# Patient Record
Sex: FEMALE | Employment: FULL TIME | URBAN - METROPOLITAN AREA
[De-identification: names, ages, dates, MRNs, and addresses within clinical notes are randomized per-mention and may not be internally consistent; named-entity substitution may affect disease eponyms.]

---

## 2024-05-31 NOTE — PROGRESS NOTES
minimal axial narrowing of the right hip joint space left hip joint space pubic symphysis sacroiliac joints are maintained.  Degenerative changes lower lumbar  3/31/2023 MRI lumbar spine mild L5-S1 degenerative changes.  Normal remaining lumbar spine.  L5-S1 decreased disc signal minimal bulge eccentric to the left causing mild left subarticular recess encroachment with with indentation and without compression of the traversing S1 nerve root.      6/4/2024 physical therapy progress note right-sided pain only.  May start in the shin of the hip and the thigh and not be in the lumbar.  More down the front of the shin into the posterior thigh the back of the knee.  Stinging pain.  Increased pain with standing moving more than sitting.  Increased pain with walking and in stores.  Right side worse than the left.  Pain on the right radiates laterally to the bottom of the right foot.  On the left side into the groin and inner thigh.  May go to the knee.  Heavy feeling both lower extremities    HISTORY OF PRESENT ILLNESS:  Very pleasant intelligent cooperative healthy-appearing female comes in today with her  for a chronic pain she has had since 2 years ago when she had a twisting injury to her lumbar spine.  Because this injury she has not been able to enjoy her activities as she could before the injury.  She is limited to about 30 minutes of activity with his gardening shopping walking.  She is still able to get all of her work done.  She does not let her  know that she is having problems because she wants to take care of him.    Examination is objectively normal.    Difficult to assess her pain generator.  May have an element of right sacroiliitis as pressure in this area and immobility seems to help.  Also has groin pain buttock pain indicating hip or supporting structures as pain generators.  Definite radicular component to her pain into the right lateral thigh shin and bottom of the foot.    After

## 2024-06-06 ENCOUNTER — OFFICE VISIT (OUTPATIENT)
Dept: NEUROSURGERY | Age: 54
End: 2024-06-06
Payer: COMMERCIAL

## 2024-06-06 VITALS
SYSTOLIC BLOOD PRESSURE: 116 MMHG | WEIGHT: 105.82 LBS | HEIGHT: 64 IN | DIASTOLIC BLOOD PRESSURE: 72 MMHG | TEMPERATURE: 97 F | BODY MASS INDEX: 18.07 KG/M2

## 2024-06-06 DIAGNOSIS — M25.551 RIGHT HIP PAIN: ICD-10-CM

## 2024-06-06 DIAGNOSIS — M46.1 SACROILIITIS (HCC): ICD-10-CM

## 2024-06-06 DIAGNOSIS — M47.27 LUMBOSACRAL SPONDYLOSIS WITH RADICULOPATHY: Primary | ICD-10-CM

## 2024-06-06 PROCEDURE — 99203 OFFICE O/P NEW LOW 30 MIN: CPT | Performed by: NEUROLOGICAL SURGERY

## 2024-06-06 RX ORDER — IBUPROFEN 600 MG/1
600 TABLET ORAL EVERY 6 HOURS PRN
COMMUNITY
Start: 2024-03-06

## 2024-06-06 RX ORDER — BUPROPION HYDROCHLORIDE 150 MG/1
150 TABLET ORAL DAILY
COMMUNITY
Start: 2024-05-11

## 2024-06-06 ASSESSMENT — ENCOUNTER SYMPTOMS
EYE PAIN: 0
CONSTIPATION: 0
BACK PAIN: 0
SHORTNESS OF BREATH: 0
NAUSEA: 0

## 2024-06-11 ENCOUNTER — INITIAL CONSULT (OUTPATIENT)
Dept: PAIN MANAGEMENT | Age: 54
End: 2024-06-11
Payer: COMMERCIAL

## 2024-06-11 VITALS
WEIGHT: 105 LBS | SYSTOLIC BLOOD PRESSURE: 114 MMHG | DIASTOLIC BLOOD PRESSURE: 70 MMHG | BODY MASS INDEX: 19.83 KG/M2 | HEIGHT: 61 IN | TEMPERATURE: 97.8 F

## 2024-06-11 DIAGNOSIS — M51.36 DDD (DEGENERATIVE DISC DISEASE), LUMBAR: ICD-10-CM

## 2024-06-11 DIAGNOSIS — M16.11 OSTEOARTHRITIS OF RIGHT HIP, UNSPECIFIED OSTEOARTHRITIS TYPE: Primary | ICD-10-CM

## 2024-06-11 DIAGNOSIS — M46.1 SACROILIITIS, NOT ELSEWHERE CLASSIFIED (HCC): ICD-10-CM

## 2024-06-11 PROCEDURE — 99203 OFFICE O/P NEW LOW 30 MIN: CPT | Performed by: PAIN MEDICINE

## 2024-06-11 ASSESSMENT — ENCOUNTER SYMPTOMS
RESPIRATORY NEGATIVE: 1
EYES NEGATIVE: 1
ALLERGIC/IMMUNOLOGIC NEGATIVE: 1
GASTROINTESTINAL NEGATIVE: 1

## 2024-06-11 NOTE — PROGRESS NOTES
History of Present Illness     Patient Identification  Ramon Jacob is a 54 y.o. female.    Patient information was obtained from patient.      Chief Complaint   Chief Complaint   Patient presents with    Hip Pain     Right hip pain       Patient presents with complaint of Right back pain. This is a result of twisting her back. Onset of pain was 2 years ago and has been unchanged since. The pain is located in right lower back, described as stabbing and rated as moderate and 5 / 10, with radiation Rt groin abd her Calf. Symptoms include no other symptoms. The patient also complains of fever, dysuria, weight loss, history of cancer, history of osteoporosis, history of steroid use, obesity. The patient denies weakness, numbness, incontinence. The patient denies other injuries. Care prior to arrival consisted of Facet blocks with no relief.  MRI: 2/1/24: MRI lumbar spine sacralization L5 L4-5 circumferential disc bulge mild left lateral recess stenosis moderate bilateral foraminal stenosis right greater than left L3-4 mild left foraminal disc bulge with annular fissure. Mild left foraminal stenosis     History reviewed. No pertinent past medical history.  History reviewed. No pertinent family history.  Current Outpatient Medications   Medication Sig Dispense Refill    ibuprofen (ADVIL;MOTRIN) 600 MG tablet Take 1 tablet by mouth every 6 hours as needed for Pain      buPROPion (WELLBUTRIN XL) 150 MG extended release tablet Take 1 tablet by mouth daily       No current facility-administered medications for this visit.     No Known Allergies    Review of Systems  Review of Systems   Constitutional: Negative.    HENT: Negative.     Eyes: Negative.    Respiratory: Negative.     Cardiovascular: Negative.    Gastrointestinal: Negative.    Endocrine: Negative.    Genitourinary: Negative.    Musculoskeletal: Negative.    Skin: Negative.    Allergic/Immunologic: Negative.    Neurological: Negative.    Hematological:

## 2024-06-13 ENCOUNTER — TELEPHONE (OUTPATIENT)
Dept: PAIN MANAGEMENT | Age: 54
End: 2024-06-13

## 2024-06-13 NOTE — TELEPHONE ENCOUNTER
RIGHT HIP INJECTION    NO AUTH REQUIRED    OK to schedule procedure approved as above.   Please note sides/levels approved and date range.   (If applicable, sides/levels approved may differ from those ordered)    TO BE SCHEDULED WITH

## 2024-07-11 ENCOUNTER — PROCEDURE VISIT (OUTPATIENT)
Age: 54
End: 2024-07-11
Payer: COMMERCIAL

## 2024-07-11 VITALS
HEIGHT: 61 IN | SYSTOLIC BLOOD PRESSURE: 106 MMHG | HEART RATE: 76 BPM | OXYGEN SATURATION: 96 % | WEIGHT: 100 LBS | DIASTOLIC BLOOD PRESSURE: 70 MMHG | BODY MASS INDEX: 18.88 KG/M2 | TEMPERATURE: 97.7 F

## 2024-07-11 DIAGNOSIS — M16.11 OSTEOARTHRITIS OF RIGHT HIP, UNSPECIFIED OSTEOARTHRITIS TYPE: Primary | ICD-10-CM

## 2024-07-11 PROCEDURE — 20610 DRAIN/INJ JOINT/BURSA W/O US: CPT | Performed by: PAIN MEDICINE

## 2024-07-11 PROCEDURE — 77002 NEEDLE LOCALIZATION BY XRAY: CPT | Performed by: PAIN MEDICINE

## 2024-07-11 RX ORDER — LIDOCAINE HYDROCHLORIDE 10 MG/ML
3 INJECTION, SOLUTION EPIDURAL; INFILTRATION; INTRACAUDAL; PERINEURAL ONCE
Status: COMPLETED | OUTPATIENT
Start: 2024-07-11 | End: 2024-07-11

## 2024-07-11 RX ORDER — TRIAMCINOLONE ACETONIDE 40 MG/ML
40 INJECTION, SUSPENSION INTRA-ARTICULAR; INTRAMUSCULAR ONCE
Status: COMPLETED | OUTPATIENT
Start: 2024-07-11 | End: 2024-07-11

## 2024-07-11 RX ORDER — BUPIVACAINE HYDROCHLORIDE 5 MG/ML
30 INJECTION, SOLUTION EPIDURAL; INTRACAUDAL ONCE
Status: COMPLETED | OUTPATIENT
Start: 2024-07-11 | End: 2024-07-11

## 2024-07-11 RX ADMIN — LIDOCAINE HYDROCHLORIDE 3 ML: 10 INJECTION, SOLUTION EPIDURAL; INFILTRATION; INTRACAUDAL; PERINEURAL at 12:54

## 2024-07-11 RX ADMIN — BUPIVACAINE HYDROCHLORIDE 150 MG: 5 INJECTION, SOLUTION EPIDURAL; INTRACAUDAL at 12:54

## 2024-07-11 RX ADMIN — TRIAMCINOLONE ACETONIDE 40 MG: 400 INJECTION, SUSPENSION INTRA-ARTICULAR; INTRAMUSCULAR at 12:54

## 2024-07-11 ASSESSMENT — ENCOUNTER SYMPTOMS
EYES NEGATIVE: 1
ALLERGIC/IMMUNOLOGIC NEGATIVE: 1
GASTROINTESTINAL NEGATIVE: 1
RESPIRATORY NEGATIVE: 1

## 2024-07-11 NOTE — PROGRESS NOTES
complications with patient and addressed  Concerns.  Informed Consent Obtained. Supine on OR Table, skin site for initial needle placement marked using Fluroscopy, Anesthetised with 2% lidocaine 2cc  . 23g needle advanced towards Right Hip Joint injection 0f 0.5cc Omnipaque produced good arthrogram,  Injected 2cc 0.5% Bupivacaine and 40mg Kenalog  with good 100% relief, Skin washed with wet Towel and Pt discharged in stable condition.

## 2024-07-19 ENCOUNTER — PROCEDURE VISIT (OUTPATIENT)
Dept: PAIN MANAGEMENT | Age: 54
End: 2024-07-19

## 2024-07-19 DIAGNOSIS — M79.604 RIGHT LEG PAIN: Primary | ICD-10-CM

## 2024-07-19 NOTE — PROGRESS NOTES
Electromyography (EMG)/Nerve conduction studies (NCS) Report: Lower Extremity    Name: Ramon Jacob   YOB: 1970  Date of Service: 7/19/2024   Provider: Moo Brooks MD        INDICATIONS:  Ramon Jacob is a 54 y.o. female who presents for electrodiagnostic evaluation for lumbar radiculopathy by request of Dr. Twila Salcido. Her main symptom for evaluation is right leg pain. Both limbs are necessary to examine in order to evaluate for any evidence of systemic disease as well as establish normal baseline values from which to compare any abnormal unilateral findings. The study is explained and verbal consent to proceed is obtained.     NERVE CONDUCTION STUDIES:    Sensory nerve conduction studies: Bilateral sural and superficial peroneal sensory nerve conduction studies demonstrate normal peak latencies and amplitudes. Waveforms are robust throughout. Bilateral lower limb temperatures are normal.     Motor nerve conduction studies: Bilateral peroneal motor nerve conduction studies with pickup over the extensor digitorum brevis demonstrate normal distal latencies and amplitudes. Bilateral tibial motor nerve conduction studies with pickup over the abductor hallucis demonstrate normal distal latencies and amplitudes.    H reflex: Bilateral H reflexes are symmetric and not prolonged.    ELECTROMYOGRAPHY: A disposable monopolar needle is used to evaluate the right vastus medialis, vastus lateralis, rectus femoris, tibialis anterior, extensor hallucis longus, flexor digitorum longus, peroneus longus, medial gastrocnemius, and lateral gastrocnemius. All of the muscles sampled are free of any increased insertional activity or any abnormal spontaneous activity. Motor unit recruitment is unremarkable.     A disposable monopolar needle is used to evaluate the left vastus medialis, tibialis anterior, extensor hallucis longus, flexor digitorum longus, peroneus longus, medial gastrocnemius, and lateral

## 2024-07-24 ENCOUNTER — OFFICE VISIT (OUTPATIENT)
Dept: PAIN MANAGEMENT | Age: 54
End: 2024-07-24
Payer: COMMERCIAL

## 2024-07-24 VITALS
BODY MASS INDEX: 18.88 KG/M2 | SYSTOLIC BLOOD PRESSURE: 120 MMHG | WEIGHT: 100 LBS | DIASTOLIC BLOOD PRESSURE: 74 MMHG | HEIGHT: 61 IN | TEMPERATURE: 97.2 F

## 2024-07-24 DIAGNOSIS — M47.27 LUMBOSACRAL SPONDYLOSIS WITH RADICULOPATHY: Primary | ICD-10-CM

## 2024-07-24 PROCEDURE — 99213 OFFICE O/P EST LOW 20 MIN: CPT | Performed by: PAIN MEDICINE

## 2024-07-24 ASSESSMENT — ENCOUNTER SYMPTOMS
ALLERGIC/IMMUNOLOGIC NEGATIVE: 1
RESPIRATORY NEGATIVE: 1
EYES NEGATIVE: 1
GASTROINTESTINAL NEGATIVE: 1

## 2024-07-24 NOTE — PROGRESS NOTES
History of Present Illness     Patient Identification  Ramon Jacob is a 54 y.o. female.    Patient information was obtained from patient.      Chief Complaint   Chief Complaint   Patient presents with    Follow-up    Hip Pain    Back Pain       Patient presents For Rt hip Joint injection with complaint of Right back pain had 50% relief for a short time still had pain in her Low back. This is a result of twisting her back. Onset of pain was 2 years ago and has been unchanged since. The pain is located in right lower back, described as stabbing and rated as moderate and 5 / 10, with radiation Rt groin abd her Calf. Symptoms include no other symptoms. The patient also complains of fever, dysuria, weight loss, history of cancer, history of osteoporosis, history of steroid use, obesity. The patient denies weakness, numbness, incontinence. The patient denies other injuries. Care prior to arrival consisted of Facet blocks with no relief.  MRI: 2/1/24: MRI lumbar spine sacralization L5 L4-5 circumferential disc bulge mild left lateral recess stenosis moderate bilateral foraminal stenosis right greater than left L3-4 mild left foraminal disc bulge with annular fissure. Mild left foraminal stenosis     No past medical history on file.  No family history on file.  Current Outpatient Medications   Medication Sig Dispense Refill    ibuprofen (ADVIL;MOTRIN) 600 MG tablet Take 1 tablet by mouth every 6 hours as needed for Pain      buPROPion (WELLBUTRIN XL) 150 MG extended release tablet Take 1 tablet by mouth daily       No current facility-administered medications for this visit.     No Known Allergies    Review of Systems  Review of Systems   Constitutional: Negative.    HENT: Negative.     Eyes: Negative.    Respiratory: Negative.     Cardiovascular: Negative.    Gastrointestinal: Negative.    Endocrine: Negative.    Genitourinary: Negative.    Musculoskeletal: Negative.    Skin: Negative.    Allergic/Immunologic:

## 2024-07-29 ENCOUNTER — TELEPHONE (OUTPATIENT)
Dept: PAIN MANAGEMENT | Age: 54
End: 2024-07-29

## 2024-07-29 NOTE — TELEPHONE ENCOUNTER
RIGHT L4-5 L5-S1 MBB     NO AUTH REQUIRED    OK to schedule procedure approved as above.   Please note sides/levels approved and date range.   (If applicable, sides/levels approved may differ from those ordered)    TO BE SCHEDULED WITH DR CASANOVA

## 2024-08-05 NOTE — TELEPHONE ENCOUNTER
Attempted to contact patient phone message stated unable to take a call at the moment call back later.

## 2024-08-07 NOTE — TELEPHONE ENCOUNTER
[facility] FOR PT TO CALL BACK AND SCHEDULE     RIGHT L4-5 L5-S1 MBB      NO AUTH REQUIRED              TO BE SCHEDULED WITH DR. CASANOVA

## 2024-08-29 ENCOUNTER — PROCEDURE VISIT (OUTPATIENT)
Age: 54
End: 2024-08-29
Payer: COMMERCIAL

## 2024-08-29 VITALS
DIASTOLIC BLOOD PRESSURE: 66 MMHG | BODY MASS INDEX: 18.88 KG/M2 | TEMPERATURE: 97.8 F | HEIGHT: 61 IN | HEART RATE: 82 BPM | SYSTOLIC BLOOD PRESSURE: 110 MMHG | OXYGEN SATURATION: 100 % | WEIGHT: 100 LBS

## 2024-08-29 DIAGNOSIS — M47.27 LUMBOSACRAL SPONDYLOSIS WITH RADICULOPATHY: Primary | ICD-10-CM

## 2024-08-29 PROCEDURE — 64493 INJ PARAVERT F JNT L/S 1 LEV: CPT | Performed by: PAIN MEDICINE

## 2024-08-29 PROCEDURE — 64494 INJ PARAVERT F JNT L/S 2 LEV: CPT | Performed by: PAIN MEDICINE

## 2024-08-29 RX ORDER — LIDOCAINE HYDROCHLORIDE 10 MG/ML
3 INJECTION, SOLUTION EPIDURAL; INFILTRATION; INTRACAUDAL; PERINEURAL ONCE
Status: COMPLETED | OUTPATIENT
Start: 2024-08-29 | End: 2024-08-29

## 2024-08-29 RX ADMIN — LIDOCAINE HYDROCHLORIDE 3 ML: 10 INJECTION, SOLUTION EPIDURAL; INFILTRATION; INTRACAUDAL; PERINEURAL at 15:46

## 2024-08-29 ASSESSMENT — ENCOUNTER SYMPTOMS
GASTROINTESTINAL NEGATIVE: 1
ALLERGIC/IMMUNOLOGIC NEGATIVE: 1
RESPIRATORY NEGATIVE: 1
EYES NEGATIVE: 1